# Patient Record
Sex: FEMALE | Race: WHITE | NOT HISPANIC OR LATINO | Employment: UNEMPLOYED | URBAN - METROPOLITAN AREA
[De-identification: names, ages, dates, MRNs, and addresses within clinical notes are randomized per-mention and may not be internally consistent; named-entity substitution may affect disease eponyms.]

---

## 2019-06-11 PROBLEM — F32.A ANXIETY AND DEPRESSION: Status: ACTIVE | Noted: 2019-06-11

## 2019-06-11 PROBLEM — F90.9 ADHD: Status: ACTIVE | Noted: 2019-06-11

## 2019-06-11 PROBLEM — F41.9 ANXIETY AND DEPRESSION: Status: ACTIVE | Noted: 2019-06-11

## 2019-07-05 ENCOUNTER — HOSPITAL ENCOUNTER (OUTPATIENT)
Facility: HOSPITAL | Age: 29
Discharge: HOME | End: 2019-07-05
Attending: OBSTETRICS & GYNECOLOGY | Admitting: OBSTETRICS & GYNECOLOGY
Payer: MEDICAID

## 2019-07-05 VITALS
DIASTOLIC BLOOD PRESSURE: 68 MMHG | SYSTOLIC BLOOD PRESSURE: 106 MMHG | RESPIRATION RATE: 16 BRPM | HEART RATE: 71 BPM | BODY MASS INDEX: 21.66 KG/M2 | OXYGEN SATURATION: 100 % | TEMPERATURE: 98 F | WEIGHT: 130 LBS | HEIGHT: 65 IN

## 2019-07-05 DIAGNOSIS — O36.8190 DECREASED FETAL MOVEMENT: ICD-10-CM

## 2019-07-05 PROCEDURE — 99281 EMR DPT VST MAYX REQ PHY/QHP: CPT | Mod: 27

## 2019-07-05 PROCEDURE — 99211 OFF/OP EST MAY X REQ PHY/QHP: CPT

## 2019-07-05 NOTE — NURSING
1035 - Pt is  at 25 weeks 6 days arrived to L&D unit via wheelchair with complaints of decreased fm. Pt denies any urinary symptoms, vaginal bleeding or leaking of fluid. Care assumed. Full assessment done, history and medications reviewed with pt. Pt updated on plan of care with questions answered. Bed in low locked position, call bell in reach. Pt placed on EFM and TOCO, automatic BP and pulse ox.     1115 -call placed to Dr. Emanuel, left voicemail, awaiting call back.    1124 - Update given to Dr. Emanuel, reassuring FHTs, no ctx on monitor. Orders to discharge home on labor precautions.    1126 - discharge instructions given to pt, questions answered, pt verbalized understanding.

## 2019-07-05 NOTE — ED TRIAGE NOTES
Pt reports she is 17 weeks pregnant and has not felt fetal movement since yesterday.  Pt voices concern due to previous miscarriage where she was in 2nd trimester.  Denies vaginal discharge.  Reports lower abdominal pain, described as sharp intermittent pain with severity 5/10.  Denies f/c/n/v/d.

## 2019-07-12 PROBLEM — O36.8190 DECREASED FETAL MOVEMENT: Status: RESOLVED | Noted: 2019-07-05 | Resolved: 2019-07-12
